# Patient Record
(demographics unavailable — no encounter records)

---

## 2025-02-13 NOTE — COUNSELING
[Nutrition/ Exercise/ Weight Management] : nutrition, exercise, weight management [Confidentiality] : confidentiality [Vitamins/Supplements] : vitamins/supplements

## 2025-02-13 NOTE — PLAN
[FreeTextEntry1] : uHCG positive in-office today. Labs ordered: HCG, progesterone, blood type, Horizon-14; repeat HCG in two days for trend. Discussed with pt the importance of following HCG results over time. TVUS today at [facility]: (preliminary report) NO gestational sac or yolk sac; endometrium 22.9mm.  Discussed with pt that sonogram may indicate early pregnancy versus abnormal pregnancy, and need for close follow-up. We discussed early pregnancy/ectopic warning signs and when to seek emergency care. Advised prenatal vitamins and avoidance of harmful substances. Pt verbalized understanding of all education/counseling. RTO one week for follow-up, discuss results, repeat ultrasound. Dr. Ahmadi notified to f/u HCG results.

## 2025-02-13 NOTE — HISTORY OF PRESENT ILLNESS
[Y] : Positive pregnancy history [Patient reported PAP Smear was normal] : Patient reported PAP Smear was normal [FreeTextEntry1] : PATIENT PRESENTS for POSITIVE HOME PREGNANCY TEST. Desired pregnancy. Denies vaginal bleeding since LMP, pelvic/abdominal pain, abnormal discharge, dysuria. LMP ~25, but patient is uncertain of the date. Reports regular monthly periods. Pt states she did a bHCG blood test at her primary doctor's office on 2/10/25 and the result was 122.  history: OB:  18: 40w6d , male, [id number] ([id number]). preeclampsia without severe features diagnosed around the time of delivery ETOP x2 (medication)  and  GYN: denies cysts, fibroids, abnormal Paps, STIs MED: remote h/o "possible asthma" (not formally diagnosed per pt) in childhood, denies hospitalization/intubation MEDS: denies SURG: denies ALLERGY: seasonal. NKDA. SOC: prior use of marijuana (not during pregnancy). denies smoking/vaping, alcohol use.  FAM: breast cancer (mother and MGM; discussed BRCA/genetic testing with pt and she will consider doing it after pregnancy); HTN (mother); glaucoma (father) [Mammogramdate] : 0 [BreastSonogramDate] : 0 [PapSmeardate] : 2024 [BoneDensityDate] : 0 [ColonoscopyDate] : 0 [LMPDate] : 1/8/25 [PGHxTotal] : 3 [PGHxAbortions] : 1 [[facility] Health Servicesiving] : 1

## 2025-02-13 NOTE — PHYSICAL EXAM
[Appropriately responsive] : appropriately responsive [Alert] : alert [No Acute Distress] : no acute distress [FreeTextEntry3] : pt declined comprehensive GYN exam including pelvic, states she must leave office now to  child from school and she did Pap test ~ summer 2024; record requested for file

## 2025-03-31 NOTE — HISTORY OF PRESENT ILLNESS
[FreeTextEntry1] : [facility] Initial Consult  Patient is a 25yo  at 10w3d GA TINO 10/21/25, based on 7week CRL, presenting for consultation for di-di twin pregnancy and h/o preeclampsia in prior pregnancy. Currently managed by Dr. Ahmadi.   Patient is doing well, no complaints. Her last pregnancy was in 2018, when she was young. H/o preeclampsia in that pregnancy, though does not remember the details surrounding it. This is a naturally conceived twin pregnancy. She denies contractions, leakage of fluid, vaginal bleeding.  All: NKDA Meds: PNV  OB Hx:  P1: FT , 7-11, h/o preeclampsia h/o 2 iTOP, no D+C  Gyn Hx: denies h/o abnormal pap smears, fibroids, cysts, STIs   PMH: denies  PSH: denies   FH: maternal aunt has CP, maternal cousin with developmental delay seizure disorder mother  partner's mother  age 41 - COVID and "hole in the heart".  SH: lives son, denies, smoking, alcohol or drug use   Psych: denies  Genetics: SMA carrier  Physical Exam: BP: 101/63, HR: 74bpm, O2sat: 98%, Wt: 179lbs, FHR twin A 156, FHR twin B 122 GA: AOx3, NAD  Heart: RRR, no M/R/G  Lungs: CTAB  Abd: soft, nontender, nondistended, gravid  LE: no erythema, edema or pain   Labs: 2018 SMA pos 2025 O pos  OBUS:  3/28 GRIS 10w3d: A = 10w6d; B = 10w4d. S=D  A/P: 25yo  at 10w3d GA, with di-di twin pregnancy  # di-di twins - The risk of a dichorionic diamniotic twin gestation were discussed including the increased risk of  labor and delivery, preeclampsia, gestational diabetes, fetal growth restriction, congenital anomalies, need for  section and uterine atony. The need for increased surveillance including prenatal visits, growth ultrasounds, and  testing was discussed. We discussed that on average dichorionic diamniotic twins deliver at around 36 weeks gestation. Induction of labor is usually performed at around 38 weeks gestation. We discussed that the mode of delivery would depend on the presentations of the fetuses at the time of delivery as well as the covering physician. - recommend sonos q4 weeks - if panorama indicates it is a monozygotic gestation, would recommend fetal echos  # h/o preeclampsia in prior pregnancy - recommend starting ASA81 at 12 weeks, sent to patient's pharmacy - recommend home BP monitoring, BP cuff sent to patient's pharmacy  # SMA carrier - patient counseled by genetic counselor - FOB tested today, will follow up result  # Pregnancy - panorama sent today - recommend early GCT as BMI 30 - rest of care per Dr. Ahmadi  RTC PRN

## 2025-03-31 NOTE — HISTORY OF PRESENT ILLNESS
[FreeTextEntry1] : [facility] Initial Consult  Patient is a 23yo  at 10w3d GA TINO 10/21/25, based on 7week CRL, presenting for consultation for di-di twin pregnancy and h/o preeclampsia in prior pregnancy. Currently managed by Dr. Ahmadi.   Patient is doing well, no complaints. Her last pregnancy was in 2018, when she was young. H/o preeclampsia in that pregnancy, though does not remember the details surrounding it. This is a naturally conceived twin pregnancy. She denies contractions, leakage of fluid, vaginal bleeding.  All: NKDA Meds: PNV  OB Hx:  P1: FT , 7-11, h/o preeclampsia h/o 2 iTOP, no D+C  Gyn Hx: denies h/o abnormal pap smears, fibroids, cysts, STIs   PMH: denies  PSH: denies   FH: maternal aunt has CP, maternal cousin with developmental delay seizure disorder mother  partner's mother  age 41 - COVID and "hole in the heart".  SH: lives son, denies, smoking, alcohol or drug use   Psych: denies  Genetics: SMA carrier  Physical Exam: BP: 101/63, HR: 74bpm, O2sat: 98%, Wt: 179lbs, FHR twin A 156, FHR twin B 122 GA: AOx3, NAD  Heart: RRR, no M/R/G  Lungs: CTAB  Abd: soft, nontender, nondistended, gravid  LE: no erythema, edema or pain   Labs: 2018 SMA pos 2025 O pos  OBUS:  3/28 GRIS 10w3d: A = 10w6d; B = 10w4d. S=D  A/P: 23yo  at 10w3d GA, with di-di twin pregnancy  # di-di twins - The risk of a dichorionic diamniotic twin gestation were discussed including the increased risk of  labor and delivery, preeclampsia, gestational diabetes, fetal growth restriction, congenital anomalies, need for  section and uterine atony. The need for increased surveillance including prenatal visits, growth ultrasounds, and  testing was discussed. We discussed that on average dichorionic diamniotic twins deliver at around 36 weeks gestation. Induction of labor is usually performed at around 38 weeks gestation. We discussed that the mode of delivery would depend on the presentations of the fetuses at the time of delivery as well as the covering physician. - recommend sonos q4 weeks - if panorama indicates it is a monozygotic gestation, would recommend fetal echos  # h/o preeclampsia in prior pregnancy - recommend starting ASA81 at 12 weeks, sent to patient's pharmacy - recommend home BP monitoring, BP cuff sent to patient's pharmacy  # SMA carrier - patient counseled by genetic counselor - FOB tested today, will follow up result  # Pregnancy - panorama sent today - recommend early GCT as BMI 30 - rest of care per Dr. Ahmadi  RTC PRN